# Patient Record
Sex: FEMALE | Race: WHITE | Employment: UNEMPLOYED
[De-identification: names, ages, dates, MRNs, and addresses within clinical notes are randomized per-mention and may not be internally consistent; named-entity substitution may affect disease eponyms.]

---

## 2019-07-23 ENCOUNTER — HOSPITAL ENCOUNTER (OUTPATIENT)
Dept: GENERAL RADIOLOGY | Facility: CLINIC | Age: 69
Discharge: HOME | End: 2019-07-25
Payer: MEDICARE

## 2019-07-23 ENCOUNTER — HOSPITAL ENCOUNTER (OUTPATIENT)
Dept: MAMMOGRAPHY | Facility: CLINIC | Age: 69
Discharge: HOME | End: 2019-07-25
Payer: MEDICARE

## 2019-07-23 DIAGNOSIS — Z78.0 POSTMENOPAUSAL: ICD-10-CM

## 2019-07-23 DIAGNOSIS — M53.3 SI (SACROILIAC) JOINT DYSFUNCTION: ICD-10-CM

## 2019-07-23 DIAGNOSIS — M54.40 ACUTE RIGHT-SIDED LOW BACK PAIN WITH SCIATICA, SCIATICA LATERALITY UNSPECIFIED: ICD-10-CM

## 2019-07-23 PROBLEM — M99.01 CERVICAL SOMATIC DYSFUNCTION: Status: ACTIVE | Noted: 2017-11-08

## 2019-07-23 PROBLEM — Z13.6 SCREENING FOR CARDIOVASCULAR CONDITION: Status: ACTIVE | Noted: 2017-11-08

## 2019-07-23 PROBLEM — N81.2 UTEROVAGINAL PROLAPSE, INCOMPLETE: Status: ACTIVE | Noted: 2017-06-08

## 2019-07-23 PROBLEM — M99.02 THORACIC REGION SOMATIC DYSFUNCTION: Status: ACTIVE | Noted: 2017-11-08

## 2019-07-23 PROCEDURE — 72100 X-RAY EXAM L-S SPINE 2/3 VWS: CPT

## 2019-07-23 PROCEDURE — 77080 DXA BONE DENSITY AXIAL: CPT

## 2019-08-22 PROBLEM — Z13.6 SCREENING FOR CARDIOVASCULAR CONDITION: Status: RESOLVED | Noted: 2017-11-08 | Resolved: 2019-08-22

## 2020-08-25 PROBLEM — M25.552 LEFT HIP PAIN: Status: ACTIVE | Noted: 2020-08-25

## 2021-10-20 ENCOUNTER — HOSPITAL ENCOUNTER (OUTPATIENT)
Dept: MAMMOGRAPHY | Facility: CLINIC | Age: 71
Discharge: HOME | End: 2021-10-22
Payer: MEDICARE

## 2021-10-20 DIAGNOSIS — Z12.31 VISIT FOR SCREENING MAMMOGRAM: ICD-10-CM

## 2021-10-20 PROCEDURE — 77067 SCR MAMMO BI INCL CAD: CPT

## 2023-01-03 RX ORDER — ZOLPIDEM TARTRATE 10 MG/1
0.5 TABLET ORAL NIGHTLY PRN
COMMUNITY

## 2023-01-04 ENCOUNTER — ANESTHESIA EVENT (OUTPATIENT)
Dept: OPERATING ROOM | Age: 73
End: 2023-01-04
Payer: MEDICARE

## 2023-01-04 ENCOUNTER — ANESTHESIA (OUTPATIENT)
Dept: OPERATING ROOM | Age: 73
End: 2023-01-04
Payer: MEDICARE

## 2023-01-04 ENCOUNTER — HOSPITAL ENCOUNTER (OUTPATIENT)
Age: 73
Setting detail: OUTPATIENT SURGERY
Discharge: HOME | End: 2023-01-04
Attending: OPHTHALMOLOGY | Admitting: OPHTHALMOLOGY
Payer: MEDICARE

## 2023-01-04 VITALS
BODY MASS INDEX: 29.09 KG/M2 | HEART RATE: 74 BPM | HEIGHT: 66 IN | TEMPERATURE: 97.7 F | OXYGEN SATURATION: 93 % | RESPIRATION RATE: 20 BRPM | DIASTOLIC BLOOD PRESSURE: 66 MMHG | WEIGHT: 181 LBS | SYSTOLIC BLOOD PRESSURE: 151 MMHG

## 2023-01-04 PROCEDURE — 3700000001 HC ADD 15 MINUTES (ANESTHESIA): Performed by: OPHTHALMOLOGY

## 2023-01-04 PROCEDURE — 2580000003 HC RX 258: Performed by: NURSE ANESTHETIST, CERTIFIED REGISTERED

## 2023-01-04 PROCEDURE — 6370000000 HC RX 637 (ALT 250 FOR IP): Performed by: OPHTHALMOLOGY

## 2023-01-04 PROCEDURE — 3600000004 HC SURGERY LEVEL 4 BASE: Performed by: OPHTHALMOLOGY

## 2023-01-04 PROCEDURE — 7100000010 HC PHASE II RECOVERY - FIRST 15 MIN: Performed by: OPHTHALMOLOGY

## 2023-01-04 PROCEDURE — 6360000002 HC RX W HCPCS: Performed by: OPHTHALMOLOGY

## 2023-01-04 PROCEDURE — 7100000000 HC PACU RECOVERY - FIRST 15 MIN: Performed by: OPHTHALMOLOGY

## 2023-01-04 PROCEDURE — 2709999900 HC NON-CHARGEABLE SUPPLY: Performed by: OPHTHALMOLOGY

## 2023-01-04 PROCEDURE — 2720000010 HC SURG SUPPLY STERILE: Performed by: OPHTHALMOLOGY

## 2023-01-04 PROCEDURE — 2580000003 HC RX 258: Performed by: OPHTHALMOLOGY

## 2023-01-04 PROCEDURE — 6360000002 HC RX W HCPCS: Performed by: NURSE ANESTHETIST, CERTIFIED REGISTERED

## 2023-01-04 PROCEDURE — 3700000000 HC ANESTHESIA ATTENDED CARE: Performed by: OPHTHALMOLOGY

## 2023-01-04 PROCEDURE — 3600000014 HC SURGERY LEVEL 4 ADDTL 15MIN: Performed by: OPHTHALMOLOGY

## 2023-01-04 PROCEDURE — 2500000003 HC RX 250 WO HCPCS: Performed by: NURSE ANESTHETIST, CERTIFIED REGISTERED

## 2023-01-04 PROCEDURE — 6360000002 HC RX W HCPCS: Performed by: STUDENT IN AN ORGANIZED HEALTH CARE EDUCATION/TRAINING PROGRAM

## 2023-01-04 PROCEDURE — 7100000001 HC PACU RECOVERY - ADDTL 15 MIN: Performed by: OPHTHALMOLOGY

## 2023-01-04 PROCEDURE — 7100000011 HC PHASE II RECOVERY - ADDTL 15 MIN: Performed by: OPHTHALMOLOGY

## 2023-01-04 PROCEDURE — 2500000003 HC RX 250 WO HCPCS: Performed by: OPHTHALMOLOGY

## 2023-01-04 RX ORDER — ONDANSETRON 2 MG/ML
4 INJECTION INTRAMUSCULAR; INTRAVENOUS
Status: COMPLETED | OUTPATIENT
Start: 2023-01-04 | End: 2023-01-04

## 2023-01-04 RX ORDER — HYDRALAZINE HYDROCHLORIDE 20 MG/ML
10 INJECTION INTRAMUSCULAR; INTRAVENOUS
Status: DISCONTINUED | OUTPATIENT
Start: 2023-01-04 | End: 2023-01-04 | Stop reason: HOSPADM

## 2023-01-04 RX ORDER — SODIUM CHLORIDE, SODIUM LACTATE, POTASSIUM CHLORIDE, CALCIUM CHLORIDE 600; 310; 30; 20 MG/100ML; MG/100ML; MG/100ML; MG/100ML
INJECTION, SOLUTION INTRAVENOUS CONTINUOUS PRN
Status: DISCONTINUED | OUTPATIENT
Start: 2023-01-04 | End: 2023-01-04 | Stop reason: SDUPTHER

## 2023-01-04 RX ORDER — PROPOFOL 10 MG/ML
INJECTION, EMULSION INTRAVENOUS PRN
Status: DISCONTINUED | OUTPATIENT
Start: 2023-01-04 | End: 2023-01-04 | Stop reason: SDUPTHER

## 2023-01-04 RX ORDER — SODIUM CHLORIDE 0.9 % (FLUSH) 0.9 %
5-40 SYRINGE (ML) INJECTION PRN
Status: DISCONTINUED | OUTPATIENT
Start: 2023-01-04 | End: 2023-01-04 | Stop reason: HOSPADM

## 2023-01-04 RX ORDER — CYCLOPENTOLATE HYDROCHLORIDE 10 MG/ML
SOLUTION/ DROPS OPHTHALMIC PRN
Status: DISCONTINUED | OUTPATIENT
Start: 2023-01-04 | End: 2023-01-04 | Stop reason: HOSPADM

## 2023-01-04 RX ORDER — ONDANSETRON 2 MG/ML
INJECTION INTRAMUSCULAR; INTRAVENOUS PRN
Status: DISCONTINUED | OUTPATIENT
Start: 2023-01-04 | End: 2023-01-04 | Stop reason: SDUPTHER

## 2023-01-04 RX ORDER — SODIUM CHLORIDE 9 MG/ML
INJECTION, SOLUTION INTRAVENOUS PRN
Status: DISCONTINUED | OUTPATIENT
Start: 2023-01-04 | End: 2023-01-04 | Stop reason: HOSPADM

## 2023-01-04 RX ORDER — DEXAMETHASONE SODIUM PHOSPHATE 10 MG/ML
INJECTION, SOLUTION INTRAMUSCULAR; INTRAVENOUS PRN
Status: DISCONTINUED | OUTPATIENT
Start: 2023-01-04 | End: 2023-01-04 | Stop reason: SDUPTHER

## 2023-01-04 RX ORDER — OFLOXACIN 3 MG/ML
1 SOLUTION/ DROPS OPHTHALMIC
Status: COMPLETED | OUTPATIENT
Start: 2023-01-04 | End: 2023-01-04

## 2023-01-04 RX ORDER — CYCLOPENTOLATE HYDROCHLORIDE 10 MG/ML
1 SOLUTION/ DROPS OPHTHALMIC
Status: COMPLETED | OUTPATIENT
Start: 2023-01-04 | End: 2023-01-04

## 2023-01-04 RX ORDER — NEOMYCIN SULFATE, POLYMYXIN B SULFATE, AND DEXAMETHASONE 3.5; 10000; 1 MG/G; [USP'U]/G; MG/G
OINTMENT OPHTHALMIC PRN
Status: DISCONTINUED | OUTPATIENT
Start: 2023-01-04 | End: 2023-01-04 | Stop reason: HOSPADM

## 2023-01-04 RX ORDER — MIDAZOLAM HYDROCHLORIDE 2 MG/2ML
2 INJECTION, SOLUTION INTRAMUSCULAR; INTRAVENOUS ONCE
Status: COMPLETED | OUTPATIENT
Start: 2023-01-04 | End: 2023-01-04

## 2023-01-04 RX ORDER — SODIUM CHLORIDE 0.9 % (FLUSH) 0.9 %
SYRINGE (ML) INJECTION PRN
Status: DISCONTINUED | OUTPATIENT
Start: 2023-01-04 | End: 2023-01-04 | Stop reason: HOSPADM

## 2023-01-04 RX ORDER — PHENYLEPHRINE HYDROCHLORIDE 100 MG/ML
1 SOLUTION/ DROPS OPHTHALMIC
Status: COMPLETED | OUTPATIENT
Start: 2023-01-04 | End: 2023-01-04

## 2023-01-04 RX ORDER — SODIUM CHLORIDE 0.9 % (FLUSH) 0.9 %
5-40 SYRINGE (ML) INJECTION EVERY 12 HOURS SCHEDULED
Status: DISCONTINUED | OUTPATIENT
Start: 2023-01-04 | End: 2023-01-04 | Stop reason: HOSPADM

## 2023-01-04 RX ORDER — LIDOCAINE HYDROCHLORIDE 10 MG/ML
INJECTION, SOLUTION EPIDURAL; INFILTRATION; INTRACAUDAL; PERINEURAL PRN
Status: DISCONTINUED | OUTPATIENT
Start: 2023-01-04 | End: 2023-01-04 | Stop reason: SDUPTHER

## 2023-01-04 RX ORDER — BALANCED SALT SOLUTION ENRICHED WITH BICARBONATE, DEXTROSE, AND GLUTATHIONE
KIT INTRAOCULAR PRN
Status: DISCONTINUED | OUTPATIENT
Start: 2023-01-04 | End: 2023-01-04 | Stop reason: HOSPADM

## 2023-01-04 RX ORDER — TROPICAMIDE 10 MG/ML
1 SOLUTION/ DROPS OPHTHALMIC
Status: COMPLETED | OUTPATIENT
Start: 2023-01-04 | End: 2023-01-04

## 2023-01-04 RX ORDER — VANCOMYCIN HYDROCHLORIDE 500 MG/10ML
INJECTION, POWDER, LYOPHILIZED, FOR SOLUTION INTRAVENOUS PRN
Status: DISCONTINUED | OUTPATIENT
Start: 2023-01-04 | End: 2023-01-04 | Stop reason: HOSPADM

## 2023-01-04 RX ORDER — ROCURONIUM BROMIDE 10 MG/ML
INJECTION, SOLUTION INTRAVENOUS PRN
Status: DISCONTINUED | OUTPATIENT
Start: 2023-01-04 | End: 2023-01-04 | Stop reason: SDUPTHER

## 2023-01-04 RX ORDER — VANCOMYCIN HYDROCHLORIDE 1 G/20ML
INJECTION, POWDER, LYOPHILIZED, FOR SOLUTION INTRAVENOUS PRN
Status: DISCONTINUED | OUTPATIENT
Start: 2023-01-04 | End: 2023-01-04 | Stop reason: HOSPADM

## 2023-01-04 RX ADMIN — SODIUM CHLORIDE, POTASSIUM CHLORIDE, SODIUM LACTATE AND CALCIUM CHLORIDE: 600; 310; 30; 20 INJECTION, SOLUTION INTRAVENOUS at 10:16

## 2023-01-04 RX ADMIN — LIDOCAINE HYDROCHLORIDE 50 MG: 10 INJECTION, SOLUTION EPIDURAL; INFILTRATION; INTRACAUDAL; PERINEURAL at 10:20

## 2023-01-04 RX ADMIN — ROCURONIUM BROMIDE 10 MG: 10 INJECTION, SOLUTION INTRAVENOUS at 11:01

## 2023-01-04 RX ADMIN — PHENYLEPHRINE HYDROCHLORIDE 100 MCG: 10 INJECTION INTRAVENOUS at 11:01

## 2023-01-04 RX ADMIN — ROCURONIUM BROMIDE 40 MG: 10 INJECTION, SOLUTION INTRAVENOUS at 10:20

## 2023-01-04 RX ADMIN — CYCLOPENTOLATE HYDROCHLORIDE 1 DROP: 10 SOLUTION/ DROPS OPHTHALMIC at 09:59

## 2023-01-04 RX ADMIN — HYDROMORPHONE HYDROCHLORIDE 0.25 MG: 1 INJECTION, SOLUTION INTRAMUSCULAR; INTRAVENOUS; SUBCUTANEOUS at 12:38

## 2023-01-04 RX ADMIN — PROPOFOL 150 MG: 10 INJECTION, EMULSION INTRAVENOUS at 10:20

## 2023-01-04 RX ADMIN — DEXAMETHASONE SODIUM PHOSPHATE 4 MG: 10 INJECTION, SOLUTION INTRAMUSCULAR; INTRAVENOUS at 10:30

## 2023-01-04 RX ADMIN — OFLOXACIN 1 DROP: 3 SOLUTION OPHTHALMIC at 09:34

## 2023-01-04 RX ADMIN — TROPICAMIDE 1 DROP: 10 SOLUTION/ DROPS OPHTHALMIC at 09:34

## 2023-01-04 RX ADMIN — CYCLOPENTOLATE HYDROCHLORIDE 1 DROP: 10 SOLUTION/ DROPS OPHTHALMIC at 10:09

## 2023-01-04 RX ADMIN — ONDANSETRON 4 MG: 2 INJECTION INTRAMUSCULAR; INTRAVENOUS at 11:11

## 2023-01-04 RX ADMIN — OFLOXACIN 1 DROP: 3 SOLUTION OPHTHALMIC at 09:44

## 2023-01-04 RX ADMIN — SUGAMMADEX 200 MG: 100 INJECTION, SOLUTION INTRAVENOUS at 11:20

## 2023-01-04 RX ADMIN — MIDAZOLAM 2 MG: 1 INJECTION INTRAMUSCULAR; INTRAVENOUS at 10:03

## 2023-01-04 RX ADMIN — OFLOXACIN 1 DROP: 3 SOLUTION OPHTHALMIC at 10:00

## 2023-01-04 RX ADMIN — PHENYLEPHRINE HYDROCHLORIDE 1 DROP: 100 SOLUTION/ DROPS OPHTHALMIC at 10:11

## 2023-01-04 RX ADMIN — TROPICAMIDE 1 DROP: 10 SOLUTION/ DROPS OPHTHALMIC at 10:10

## 2023-01-04 RX ADMIN — PHENYLEPHRINE HYDROCHLORIDE 1 DROP: 100 SOLUTION/ DROPS OPHTHALMIC at 09:58

## 2023-01-04 RX ADMIN — CYCLOPENTOLATE HYDROCHLORIDE 1 DROP: 10 SOLUTION/ DROPS OPHTHALMIC at 09:34

## 2023-01-04 RX ADMIN — OFLOXACIN 1 DROP: 3 SOLUTION OPHTHALMIC at 10:11

## 2023-01-04 RX ADMIN — HYDROMORPHONE HYDROCHLORIDE 0.25 MG: 1 INJECTION, SOLUTION INTRAMUSCULAR; INTRAVENOUS; SUBCUTANEOUS at 12:54

## 2023-01-04 RX ADMIN — CYCLOPENTOLATE HYDROCHLORIDE 1 DROP: 10 SOLUTION/ DROPS OPHTHALMIC at 09:44

## 2023-01-04 RX ADMIN — ONDANSETRON 4 MG: 2 INJECTION INTRAMUSCULAR; INTRAVENOUS at 12:02

## 2023-01-04 RX ADMIN — TROPICAMIDE 1 DROP: 10 SOLUTION/ DROPS OPHTHALMIC at 09:44

## 2023-01-04 RX ADMIN — TROPICAMIDE 1 DROP: 10 SOLUTION/ DROPS OPHTHALMIC at 10:00

## 2023-01-04 RX ADMIN — PHENYLEPHRINE HYDROCHLORIDE 1 DROP: 100 SOLUTION/ DROPS OPHTHALMIC at 10:13

## 2023-01-04 ASSESSMENT — PAIN DESCRIPTION - LOCATION: LOCATION: EYE

## 2023-01-04 ASSESSMENT — PAIN SCALES - WONG BAKER
WONGBAKER_NUMERICALRESPONSE: 0
WONGBAKER_NUMERICALRESPONSE: 0

## 2023-01-04 ASSESSMENT — PAIN SCALES - GENERAL
PAINLEVEL_OUTOF10: 0
PAINLEVEL_OUTOF10: 5
PAINLEVEL_OUTOF10: 5

## 2023-01-04 ASSESSMENT — PAIN DESCRIPTION - DESCRIPTORS: DESCRIPTORS: BURNING

## 2023-01-04 ASSESSMENT — PAIN DESCRIPTION - ORIENTATION: ORIENTATION: LEFT

## 2023-01-04 NOTE — PROGRESS NOTES
09:40- Admission database completed and \"sudafed\" added as an allergy as pt states she is allergic and breaks out in a \"severe rash\" when she takes it. I contacted the pharmacist as pt has phenylephrine eye drops ordered pre- procedure. Pharmacist stated she needs to look into and call me back. 09:45- Pharmacist called back and stated I needed to check with Dr. Kristofer Mckay to see if there is an alternate eye drop he would like to use. 09:55- Dr. Lester Cirilo in to see pt and stated she has the eye drop in his office. Pt denies having a reaction after therefore Dr. Kristofer Mckay ok'd the use of the phenylephrine eye drop. Drops administered as ordered.

## 2023-01-04 NOTE — BRIEF OP NOTE
Brief Postoperative Note      Patient: Tia Reasons  YOB: 1950  MRN: [id number]    Date of Procedure: 1/4/2023    Pre-Op Diagnosis: LEFT MACULAR HOLE    Post-Op Diagnosis: Same       Procedure(s):  PARS PLANA VITRECTOMY 25 GAUGE, MEMBRANE PEEL, AIR FLUID EXCHANGE, AIR GAS EXCHANGE WITH 12% C3F8,  ICG    Surgeon(s):  Tirso Hair MD    Assistant:  * No surgical staff found *    Anesthesia: General    Estimated Blood Loss (mL): Minimal    Complications: None    Specimens:   * No specimens in log *    Implants:  * No implants in log *      Drains: * No LDAs found *    Findings:     Electronically signed by Tirso Hair MD on 1/4/2023 at 11:34 AM

## 2023-01-04 NOTE — ANESTHESIA PRE PROCEDURE
Department of Anesthesiology  Preprocedure Note       Name:  Silvana Zhang   Age:  67 y.o.  :  1950                                          MRN:  [id number]         Date:  2023      Surgeon: Mana Gerber):  Ousmane Lai MD    Procedure: Procedure(s):  PARS PLANA VITRECTOMY 25 GAUGE, MEMBRANE PEEL, FLUID GAS EXCHANGE,  ICG    Medications prior to admission:   Prior to Admission medications    Medication Sig Start Date End Date Taking? Authorizing Provider   zolpidem (AMBIEN) 10 MG tablet Take 0.5 tablets by mouth nightly as needed. Historical Provider, MD   levothyroxine (SYNTHROID) 75 MCG tablet TAKE 1 TABLET DAILY 22   Yamil Jones MD   simvastatin (ZOCOR) 20 MG tablet TAKE 1 TABLET NIGHTLY 22   Yamil Jones MD   citalopram (CELEXA) 10 MG tablet TAKE 1 TABLET DAILY 22   MD Patricio Meza MISC by Does not apply route 5 years 22   Yamil Jones MD   albuterol sulfate HFA (PROVENTIL;VENTOLIN;PROAIR) 108 (90 Base) MCG/ACT inhaler Inhale 2 puffs into the lungs every 4-6 hours as needed Hasn't needed to use    Historical Provider, MD   budesonide-formoterol (SYMBICORT) 80-4.5 MCG/ACT AERO Inhale 2 puffs into the lungs 2 times daily Pt states she hasn't needed to use it    Historical Provider, MD       Current medications:    No current facility-administered medications for this encounter. Current Outpatient Medications   Medication Sig Dispense Refill    zolpidem (AMBIEN) 10 MG tablet Take 0.5 tablets by mouth nightly as needed.       levothyroxine (SYNTHROID) 75 MCG tablet TAKE 1 TABLET DAILY 90 tablet 3    simvastatin (ZOCOR) 20 MG tablet TAKE 1 TABLET NIGHTLY 90 tablet 3    citalopram (CELEXA) 10 MG tablet TAKE 1 TABLET DAILY 90 tablet 3    Handicap Placard MISC by Does not apply route 5 years 1 each 0    albuterol sulfate HFA (PROVENTIL;VENTOLIN;PROAIR) 108 (90 Base) MCG/ACT inhaler Inhale 2 puffs into the lungs every 4-6 hours as needed Hasn't needed to use      budesonide-formoterol (SYMBICORT) 80-4.5 MCG/ACT AERO Inhale 2 puffs into the lungs 2 times daily Pt states she hasn't needed to use it         Allergies:     Allergies   Allergen Reactions    Penicillins Hives and Other (See Comments)    Atorvastatin      Other reaction(s): Muscle Pain       Problem List:    Patient Active Problem List   Diagnosis Code    Nephrolithiasis N20.0    Hypercholesterolemia E78.00    Hypothyroidism E03.9    Kidney stone on right side N20.0    Right flank pain R10.9    Thyroid disease E07.9    Arthritis M19.90    Asthma J45.909    Ureterolithiasis N20.1    Anemia D64.9    Hyperglycemia R73.9    Elevated lipase R74.8    Abdominal pain R10.9    Gallstones K80.20    Renal calculus, right N20.0    Atopic rhinitis J30.9    Blood glucose abnormal R73.09    Cervical somatic dysfunction M99.01    Insomnia G47.00    Obstructive sleep apnea syndrome G47.33    On statin therapy Z79.899    SI (sacroiliac) joint dysfunction M53.3    Snoring R06.83    Thoracic region somatic dysfunction M99.02    Uterine leiomyoma D25.9    Uterovaginal prolapse, incomplete N81.2    Left hip pain M25.552       Past Medical History:        Diagnosis Date    Arthritis     Asthma     History of foot fracture     Hyperlipidemia     Hypothyroidism     Kidney stone     rt ureter stent placed and removed    Sleep apnea     no cpap    Thyroid disease     Under care of team     [facility] Dr. Greta Grier sees q month    Wears dentures     Wears reading eyeglasses     Wellness examination     Dr. Harley Ortiz last visit mid 2022       Past Surgical History:        Procedure Laterality Date    CHOLECYSTECTOMY      CYSTOSCOPY  10/28/2014    EYE SURGERY Left 2019    laser lt eye 2019   bilat cataracts    FOOT SURGERY Left 2014    HYSTERECTOMY, TOTAL ABDOMINAL (CERVIX REMOVED)      LITHOTRIPSY Right 11/03/2014    LITHOTRIPSY Right 03/31/2015    cysto,stent    URETER STENT PLACEMENT Right 10/2014       Social History:    Social History     Tobacco Use    Smoking status: Never    Smokeless tobacco: Never   Substance Use Topics    Alcohol use: No     Comment: very seldom per pt                                Counseling given: Not Answered      Vital Signs (Current):   Vitals:    01/03/23 1443   Weight: 181 lb (82.1 kg)   Height: 5' 6\" (1.676 m)                                              BP Readings from Last 3 Encounters:   09/13/22 100/62   08/30/22 122/78   03/18/22 112/64       NPO Status:                                                                                 BMI:   Wt Readings from Last 3 Encounters:   01/03/23 181 lb (82.1 kg)   09/13/22 189 lb (85.7 kg)   08/30/22 188 lb (85.3 kg)     Body mass index is 29.21 kg/m². CBC:   Lab Results   Component Value Date/Time    WBC 5.6 01/18/2022 12:00 AM    RBC 4.68 01/18/2022 12:00 AM    HGB 14.1 01/18/2022 12:00 AM    HCT 42.8 01/18/2022 12:00 AM    MCV 91 01/18/2022 12:00 AM    RDW 14.0 01/18/2022 12:00 AM     01/18/2022 12:00 AM       CMP:   Lab Results   Component Value Date/Time     01/18/2022 12:00 AM    K 4.4 01/18/2022 12:00 AM     01/18/2022 12:00 AM    CO2 25 01/18/2022 12:00 AM    BUN 21 01/18/2022 12:00 AM    CREATININE 1.19 01/18/2022 12:00 AM    GFRAA >60 04/03/2015 06:17 AM    LABGLOM >60 04/03/2015 06:17 AM    GLUCOSE 135 04/03/2015 06:17 AM    PROT 7.6 01/18/2022 12:00 AM    CALCIUM 9.7 01/18/2022 12:00 AM    BILITOT 0.7 01/18/2022 12:00 AM    ALKPHOS 104 01/18/2022 12:00 AM    AST 15 01/18/2022 12:00 AM    ALT 12 01/18/2022 12:00 AM       POC Tests: No results for input(s): POCGLU, POCNA, POCK, POCCL, POCBUN, POCHEMO, POCHCT in the last 72 hours.     Coags:   Lab Results   Component Value Date/Time    PROTIME 10.4 04/18/2014 01:53 AM    INR 1.0 04/18/2014 01:53 AM    APTT 26.1 04/18/2014 01:53 AM       HCG (If Applicable): No results found for: PREGTESTUR, PREGSERUM, HCG, HCGQUANT     ABGs: No results found for: PHART, PO2ART, YPK8JEN, IAK0STD, BEART, Y8BTESBE     Type & Screen (If Applicable):  No results found for: LABABO, LABRH    Drug/Infectious Status (If Applicable):  No results found for: HIV, HEPCAB    COVID-19 Screening (If Applicable): No results found for: COVID19        Anesthesia Evaluation  Patient summary reviewed and Nursing notes reviewed no history of anesthetic complications:   Airway: Mallampati: II  TM distance: >3 FB   Neck ROM: full  Mouth opening: > = 3 FB   Dental:          Pulmonary: breath sounds clear to auscultation  (+) sleep apnea:  asthma:                            Cardiovascular:    (+) hyperlipidemia        Rhythm: regular  Rate: normal                    Neuro/Psych:   Negative Neuro/Psych ROS              GI/Hepatic/Renal: Neg GI/Hepatic/Renal ROS            Endo/Other:    (+) hypothyroidism, blood dyscrasia: anemia, arthritis:., .                 Abdominal:             Vascular: negative vascular ROS. Other Findings:           Anesthesia Plan      MAC and general     ASA 2       Induction: intravenous. Anesthetic plan and risks discussed with patient. Plan discussed with CRNA.                     Shahrzad Greco MD   1/4/2023

## 2023-01-04 NOTE — ANESTHESIA POSTPROCEDURE EVALUATION
Department of Anesthesiology  Postprocedure Note    Patient: Devonte Presley  MRN: [id number]  YOB: 1950  Date of evaluation: 1/4/2023      Procedure Summary     Date: 01/04/23 Room / Location: [address]    Anesthesia Start: 7362 Anesthesia Stop: 9476    Procedure: PARS PLANA VITRECTOMY 25 GAUGE, MEMBRANE PEEL, AIR FLUID EXCHANGE, AIR GAS EXCHANGE WITH 12% C3F8,  ICG (Left: Eye) Diagnosis:       Lamellar macular hole of left eye      (LEFT MACULAR HOLE)    Surgeons: Perez Alvarez MD Responsible Provider: Mir Schmitz MD    Anesthesia Type: MAC, general ASA Status: 2          Anesthesia Type: No value filed.     Jeanne Phase I: Jeanne Score: 9    Jeanne Phase II:        Anesthesia Post Evaluation    Patient location during evaluation: bedside  Patient participation: complete - patient participated  Level of consciousness: awake  Airway patency: patent  Nausea & Vomiting: no nausea and no vomiting  Complications: no  Cardiovascular status: blood pressure returned to baseline  Respiratory status: acceptable  Hydration status: euvolemic  Comments: /88   Pulse 71   Temp 97.1 °F (36.2 °C) (Temporal)   Resp 14   Ht 5' 6\" (1.676 m)   Wt 181 lb (82.1 kg)   SpO2 100%   BMI 29.21 kg/m²

## 2023-01-04 NOTE — H&P
History and Physical    Pt Name: Jocelyn Bellamy  MRN: [id number]  YOB: 1950  Date of evaluation: 1/4/2023  Primary Care Physician: Yared Pryor MD    SUBJECTIVE:   History of Chief Complaint:    Jocelyn Bellamy is a 67 y.o. female who is scheduled today for PARS PLANA VITRECTOMY 25 GAUGE, MEMBRANE PEEL, FLUID GAS EXCHANGE,  ICG - Left. Patient reports history of wavy, distorted, blurred vision to her left eye for the last several months. Patient states her right eye is asymptomatic. She has a history of bilateral cataract extraction in 2021. Allergies  is allergic to penicillins, atorvastatin, and sudafed [pseudoephedrine]. Medications  Prior to Admission medications    Medication Sig Start Date End Date Taking? Authorizing Provider   zolpidem (AMBIEN) 10 MG tablet Take 0.5 tablets by mouth nightly as needed. Historical Provider, MD   levothyroxine (SYNTHROID) 75 MCG tablet TAKE 1 TABLET DAILY 12/28/22   Yared Pryor MD   simvastatin (ZOCOR) 20 MG tablet TAKE 1 TABLET NIGHTLY 12/5/22   Yared Pryor MD   citalopram (CELEXA) 10 MG tablet TAKE 1 TABLET DAILY 9/2/22   Yared Pryor MD   Handicap Ashley MISC by Does not apply route 5 years 8/30/22   Yared Pryor MD   albuterol sulfate HFA (PROVENTIL;VENTOLIN;PROAIR) 108 (90 Base) MCG/ACT inhaler Inhale 2 puffs into the lungs every 4-6 hours as needed Hasn't needed to use    Historical MD Christiana   budesonide-formoterol (SYMBICORT) 80-4.5 MCG/ACT AERO Inhale 2 puffs into the lungs 2 times daily Pt states she hasn't needed to use it    Historical Provider, MD     Past Medical History    has a past medical history of Arthritis, Asthma, History of foot fracture, Hyperlipidemia, Hypothyroidism, Kidney stone, Sleep apnea, Thyroid disease, Under care of team, Wears dentures, Wears reading eyeglasses, and Wellness examination. Past Surgical History   has a past surgical history that includes Cystocopy (10/28/2014);  Lithotripsy (Right, 2014); Ureter stent placement (Right, 10/2014); Lithotripsy (Right, 2015); Cholecystectomy; Hysterectomy, total abdominal; Foot surgery (Left, ); and eye surgery (Left, ). Social History   reports that she has never smoked. She has never used smokeless tobacco.   reports no history of alcohol use. reports no history of drug use. Marital Status   Children 2  Occupation retired  Family History  Family Status   Relation Name Status    Mother      Father      Brother  (Not Specified)     family history includes Cancer in her brother and father; High Cholesterol in her father. Review of Systems:  CONSTITUTIONAL:   negative for fevers, chills, fatigue and malaise    EYES:   negative for double vision, blurred, distorted, wavy vision to left eye   HEENT:   negative for tinnitus, epistaxis and sore throat     RESPIRATORY:   negative for cough, shortness of breath, wheezing     CARDIOVASCULAR:   negative for chest pain, palpitations, syncope, edema     GASTROINTESTINAL:   negative for nausea, vomiting     GENITOURINARY:   negative for incontinence     MUSCULOSKELETAL:   negative for neck or back pain     NEUROLOGICAL:   Negative for weakness and tingling  negative for headaches and dizziness     PSYCHIATRIC:   negative for anxiety       OBJECTIVE:   VITALS:  height is 5' 6\" (1.676 m) and weight is 181 lb (82.1 kg). See nursing flowsheet. CONSTITUTIONAL:alert & oriented x 3, no acute distress. Calm and pleasant. SKIN:  Warm and dry, no rashes to exposed areas of skin. HEAD:  Normocephalic, atraumatic. EYES: PERRL. EOMs intact. EARS:  Intact and equal bilaterally. No edema or thickening, without lumps, lesions, or discharge. Hearing grossly WNL. NOSE:  Nares patent. No rhinorrhea. MOUTH/THROAT:  Mucous membranes pink and moist, uvula midline, wearing full upper and lower dentures. NECK: Supple, no lymphadenopathy.   LUNGS: Respirations even and non-labored. Clear to auscultation bilaterally, no wheezes, rales, or rhonchi. CARDIOVASCULAR: Regular rate and rhythm, no murmurs/rubs/gallops. ABDOMEN: soft, non-tender and non-distended, bowel sounds active x 4. EXTREMITIES: No edema to bilateral lower extremities. No varicosities to bilateral lower extremities. NEUROLOGIC: CN II-XII are grossly intact. Gait not assessed. IMPRESSIONS:   Lamellar macular hole of left eye. PLANS:   PARS PLANA VITRECTOMY 25 GAUGE, MEMBRANE PEEL, FLUID GAS EXCHANGE,  ICG - Left.     LEW Jones - CNP   Electronically signed 1/4/2023 at 9:16 AM

## 2023-01-04 NOTE — DISCHARGE INSTRUCTIONS
Going Home Instructions and Medication Schedule  Use one drop in the operated eye(s) at each time marked with a CHECK  If you are using more than one kind of drop, allow three minutes between drops. Use Pain Medication Prescribed by your Surgeon    Positioning    [x]  Face Down     []  Left Side     []  Right Side     []  Either Side       []  Head Elevated on back at 30 deg     [x]  Right Side Down( while in hospital )          Going Home Instructions and Medication Schedule  Use one drop in the operated eye(s) at each time marked with a CHECK  If you are using more than one kind of drop, allow three minutes between drops. Use Pain Medication Prescribed by your Surgeon    Positioning    []  Face Down     []  Left Side     []  Right Side     []  Either Side       []  Head Elevated on back at 30 deg     []  Right Side Down     []  Left Side Down     LOOK DOWN  FOR 24 HRS       When Cleansing the Eye:  1. Wash you hands with soap and water. 2.  Open 2 X 2 dressing and eye patch, tear 6 inches along tape. 3.  Moisten 2 X 2 dressing with sterile irrigating solution. 4.  Gently cleanse eye with one 2 X 2 from inside corner of eye to outside edge. Then        repeat with second 2 X 2. Remember to only use 2 X 2 once. When Giving Eye Drops or Ointments:  1. Cleanse eye as above before giving drops or ointments. 2.  Have the patient look toward the ceiling with both eyes open. 3.  Pull the lower lid down - steady your hand on the patient's forehead. 4.  Put a drop of medicine or a small strip of ointment in the sac behind the       lashes of the lower lid,  5. The tip of the dropper or ointment tube should not touch the eye itself. 6.  Don't give more than one eye medicine at a time. Wait about 3 minutes between           each. 7.  When using both ointment and drops, use the ointment after the drops. 8. If you put in your own drops, it may be easier to lie down.   Ask a friend or relative to make sure you actually get the medication into the eye. General Instruction:  1. Resume all medications taken before hospitalization. 2.  You may have occasional discomfort after returning home. Take 1 - 2 regular or               extra strength acetaminophen (Tylenol) or other non-aspirin pain relievers every 4 - 6       hours if needed for pain. 3.  If you experience severe pain which is not relieved by the above measures, or if you         develop excessive discharge, please call our office. 4.  For any other problems or questions that arise, please call our office. 5. Bring all post op drops given to you after surgery to your post -op appointment tomorrow . Do not use any drops until you are given instructions  by Dr Kenna Dominique 's staff tomorrow. Please bring this instruction sheet and your medication with you to your office  Visit.     Name of Drop Cap Color 8:00 AM  Breakfast 12:00 PM  Lunch 6:00 PM  Supper 10:00 PM  Bedtime   Acular/[place] Shaw   []     []         []         []         Alphagan Purple    []        []         []         []         Cosopt White/Blue   []         []         []         []         Econopred Plus [place]   []         []         []         []         Lotemax Pink   []         []         []         []         Lumigan Teal   []         []         []         []         Ocuflox/Ciloxan Knott   []         []         []         []         Pred G White   []         []         []         []         Timolol/Betimol Yellow   []         []         []         []         Travatan/Xalatan Teal   []         []         []         []         Trusopt Orange   []         []         []         []         Vigamox Knott   []         []         []         []         ATROPINE Red   []         []         []         []         Ointment    []         []         []         [x]           Additional Instructions and Information:    1.  fI gas was put in your eye during you operation, airplane travel is not permitted until this is approved by your doctor. If you expect to undergo any procedures requiring anesthesia, including dental procedures requiring nitrous gas, notify your doctor                immediately. 2.  If the operated eye is comfortable, you may go without an eye patch. You should wear your regular glasses or sunglasses during the day, which will provide protection for the eye. If you were given an eye shield protector, wear this at night over the operate eye. 3.  Light flashes or other visula symptoms are commin during the post-operative period. Both eyes can be light sensitive and neither condition is cause for alarm. Dark glasses may be helpful in decreasing light sensitivity. Activities May Be Resumed According to the Following Schedule: Activity Time After [facility] or shower with care to keep soap away from eyes Immediately   Shaving Immediately   Careful walking outdoors with a  (weather permitting) Immediately   Reading or watching television Immediately   Riding in a car Immediately   Shampooing hair (head held back _____  Immediately         _____ 2 days   Routine household chores (no scrubbing floors or lifting heavy object)  One Week   Haircut or appointment at beauty shop _____  Immediately         _____One Week   Return to employment Ask Physician   Driving Ask Physician     Any Activity That Makes Denyce Showers to the Head May Cause Eye Discomfort: The following activities should be avoided:  A. Lifting over 20 pounds (about a case of soda)  B. Sever physical exertion  C. Bending forward (keep the head upright and bend at the knees  D. Straining with bowel movement (use mild laxative or tiffany softener for constipation)     No alcoholic beverages, no driving or operating machinery, no making important decisions for 24 hours. You may have a normal diet but should eat lightly day of surgery. Drink plenty of fluids.   Urinate within 8 hours after surgery, if unable to urinate call your doctor    []  Left Side Down     LOOK DOWN  FOR 24 HRS

## 2023-01-05 NOTE — OP NOTE
[address] [facility]                                OPERATIVE REPORT    PATIENT NAME: Selivn Alston                       :        1950  MED REC NO:   [id number]                             ROOM:  ACCOUNT NO:   [de-identified]                           ADMIT DATE: 2023  PROVIDER:     Shun Gallego    DATE OF PROCEDURE:  2023    PREOPERATIVE DIAGNOSIS:  Left macular hole. POSTOPERATIVE DIAGNOSIS:  Left macular hole. OPERATION PERFORMED:  Left pars plana vitrectomy, membrane stripping,  air-fluid gas exchange. SURGEON:  Shun Obrien. Rosemary Ross MD    COMPLICATIONS:  None. ANESTHETIC:  General.    BLOOD LOSS:  0.    INDICATIONS FOR SURGERY:  The patient presented with a full-thickness  macular hole in the left eye. It was quite large. There was also  significant underlying dry AMD.  I explained to the patient the  prognosis is guarded mainly because the hole is very large, chronic, and  also because she has underlying AMD which will affect the visual  prognosis. I discussed risks and benefits of the surgery. I explained  that the hole may not close. Discussed the possibility the vision may  not improve because of the AMD, possibility of retinal detachment,  infection, glaucoma, optic neuropathy, loss of vision, blindness, loss  of eye. The patient understood these risks and was willing to proceed  with surgery. Also, she requested general anesthetic because of severe  anxiety and also because she cannot hold still. I discussed the risks  and benefits of general anesthesia along with the anesthesiologist.    OPERATIVE PROCEDURE:  The patient was taken to the operating room. General anesthetic was administered. The patient's left eye was draped  and prepped in usual sterile fashion.   In anticipation for a 25-gauge  surgery, Betadine was placed in the conjunctiva as prophylaxis against  infection. Measuring 3.5 mm posterior to the limbus in the  inferotemporal, supratemporal, and superonasal, trocars were placed  followed by the infusion cannula which was hooked in place  inferotemporally. Care was taken to ensure the infusion cannula was  seen in the vitreous cavity prior to turning the infusion cannula on. It was then turned on to 35. Ocutome with light pipe was placed in the  vitreous cavity. Core vitrectomy was carried out. PVD was induced with  the ocutome, and the posterior hyaloid was removed in a  posterior-anterior fashion for 360 degrees. At this time, several drops  of ICG in D5W only solution with no BSS were placed over the posterior  pole and allowed to stain for approximately 5 seconds and vacuumed off  with a silicone-tipped extrusion needle. All ICG was removed from the  macular hole in the optic nerve. Then, using a pancake contact lens, a  scratch down the internal membrane was made and the edge was lifted with  a Finesse laci-dusted loop. The edge of the ILM was grasped with the  ILM forceps and maculorrhexis performed leaving all traction off the  optic nerve. The entire maneuver took approximately 3 minutes. Light  was far away from the macula. Light was at 30 at all times. Peripheral  examination and scleral depression did not reveal any hole, detachment,  or retinal dialysis. At this time, air-fluid exchange was carried out  with silicone-tipped extrusion needle followed by 45 mL injection of 12%  C3F8 gas and vented out through _____ needle. The superior trocars were  removed. One did leak and I closed that with single 6-0 gut suture. The infusion cannula was removed. This was airtight as well. Subconjunctival vancomycin was given superiorly and inferiorly. Antibiotic ointment was placed and the eye patched and shielded.   The  patient returned to recovery room in satisfactory condition with  instructions for strict face-down positioning until the following day.         Roman Dyer    D: 01/05/2023 0:12:16       T: 01/05/2023 3:24:03     [email or web address]  Job#: [id number]     Doc#: [id number]    CC:

## 2023-09-12 ENCOUNTER — HOSPITAL ENCOUNTER (OUTPATIENT)
Dept: VASCULAR LAB | Age: 73
Discharge: HOME | End: 2023-09-14
Payer: MEDICARE

## 2023-09-12 DIAGNOSIS — R42 DIZZINESS: ICD-10-CM

## 2023-09-12 LAB
VAS LEFT BULB EDV: 22.1 CM/S
VAS LEFT BULB PSV: 85.2 CM/S
VAS LEFT CCA DIST EDV: 18.9 CM/S
VAS LEFT CCA DIST PSV: 85.2 CM/S
VAS LEFT CCA PROX EDV: 26.9 CM/S
VAS LEFT CCA PROX PSV: 128.9 CM/S
VAS LEFT ECA EDV: 12.38 CM/S
VAS LEFT ECA PSV: 94.9 CM/S
VAS LEFT ICA DIST EDV: 31.9 CM/S
VAS LEFT ICA DIST PSV: 98 CM/S
VAS LEFT ICA PROX EDV: 20.3 CM/S
VAS LEFT ICA PROX PSV: 70.8 CM/S
VAS LEFT ICA/CCA PSV: 1.15 NO UNITS
VAS LEFT VERTEBRAL EDV: 9.3 CM/S
VAS LEFT VERTEBRAL PSV: 39 CM/S
VAS RIGHT BULB EDV: 18.9 CM/S
VAS RIGHT BULB PSV: 77.3 CM/S
VAS RIGHT CCA DIST EDV: 13.7 CM/S
VAS RIGHT CCA DIST PSV: 81.2 CM/S
VAS RIGHT CCA PROX EDV: 17.6 CM/S
VAS RIGHT CCA PROX PSV: 92.8 CM/S
VAS RIGHT ECA EDV: 13.74 CM/S
VAS RIGHT ECA PSV: 105.8 CM/S
VAS RIGHT ICA DIST EDV: 12.4 CM/S
VAS RIGHT ICA DIST PSV: 53.9 CM/S
VAS RIGHT ICA PROX EDV: 20.2 CM/S
VAS RIGHT ICA PROX PSV: 69.5 CM/S
VAS RIGHT ICA/CCA PSV: 1 NO UNITS
VAS RIGHT VERTEBRAL EDV: 15.04 CM/S
VAS RIGHT VERTEBRAL PSV: 46.2 CM/S

## 2023-09-12 PROCEDURE — 93880 EXTRACRANIAL BILAT STUDY: CPT | Performed by: SURGERY

## 2023-09-12 PROCEDURE — 93880 EXTRACRANIAL BILAT STUDY: CPT

## 2023-09-15 ENCOUNTER — HOSPITAL ENCOUNTER (OUTPATIENT)
Facility: CLINIC | Age: 73
Discharge: HOME | End: 2023-09-15
Payer: MEDICARE

## 2023-09-15 DIAGNOSIS — E03.8 OTHER SPECIFIED HYPOTHYROIDISM: ICD-10-CM

## 2023-09-15 PROCEDURE — 36415 COLL VENOUS BLD VENIPUNCTURE: CPT

## 2023-09-15 PROCEDURE — 84439 ASSAY OF FREE THYROXINE: CPT

## 2023-09-15 PROCEDURE — 84443 ASSAY THYROID STIM HORMONE: CPT

## 2023-09-16 LAB
T4 FREE SERPL-MCNC: 1.2 NG/DL (ref 0.9–1.7)
TSH SERPL DL<=0.05 MIU/L-ACNC: 1.53 UIU/ML (ref 0.3–5)

## 2023-10-05 ENCOUNTER — HOSPITAL ENCOUNTER (OUTPATIENT)
Age: 73
Discharge: HOME | End: 2023-10-07
Payer: MEDICARE

## 2023-10-05 ENCOUNTER — HOSPITAL ENCOUNTER (OUTPATIENT)
Dept: MRI IMAGING | Age: 73
Discharge: HOME | End: 2023-10-07
Payer: MEDICARE

## 2023-10-05 DIAGNOSIS — R42 DIZZINESS: ICD-10-CM

## 2023-10-05 DIAGNOSIS — G44.201 ACUTE INTRACTABLE TENSION-TYPE HEADACHE: ICD-10-CM

## 2023-10-05 DIAGNOSIS — R00.2 PALPITATIONS: ICD-10-CM

## 2023-10-05 DIAGNOSIS — R06.00 ACUTE DYSPNEA: ICD-10-CM

## 2023-10-05 LAB
CREAT SERPL-MCNC: 1.3 MG/DL (ref 0.5–0.9)
ECHO AO ROOT DIAM: 2.6 CM
ECHO AV PEAK GRADIENT: 6 MMHG
ECHO AV PEAK VELOCITY: 1.3 M/S
ECHO LA AREA 2C: 15.7 CM2
ECHO LA AREA 4C: 17.4 CM2
ECHO LA DIAMETER: 3.4 CM
ECHO LA MAJOR AXIS: 5.5 CM
ECHO LA MINOR AXIS: 5 CM
ECHO LA TO AORTIC ROOT RATIO: 1.31
ECHO LA VOL 2C: 42 ML (ref 22–52)
ECHO LA VOL 4C: 42 ML (ref 22–52)
ECHO LA VOL BP: 44 ML (ref 22–52)
ECHO LV E' LATERAL VELOCITY: 8 CM/S
ECHO LV E' SEPTAL VELOCITY: 6 CM/S
ECHO LV FRACTIONAL SHORTENING: 36 % (ref 28–44)
ECHO LV INTERNAL DIMENSION DIASTOLIC: 4.4 CM (ref 3.9–5.3)
ECHO LV INTERNAL DIMENSION SYSTOLIC: 2.8 CM
ECHO LV IVSD: 0.8 CM (ref 0.6–0.9)
ECHO LV MASS 2D: 118.6 G (ref 67–162)
ECHO LV POSTERIOR WALL DIASTOLIC: 0.9 CM (ref 0.6–0.9)
ECHO LV RELATIVE WALL THICKNESS RATIO: 0.41
ECHO MV A VELOCITY: 1 M/S
ECHO MV E DECELERATION TIME (DT): 180 MS
ECHO MV E VELOCITY: 0.67 M/S
ECHO MV E/A RATIO: 0.67
ECHO MV E/E' LATERAL: 8.38
ECHO MV E/E' RATIO (AVERAGED): 9.77
ECHO MV E/E' SEPTAL: 11.17
GFR SERPL CREATININE-BSD FRML MDRD: 43 ML/MIN/1.73M2

## 2023-10-05 PROCEDURE — 70553 MRI BRAIN STEM W/O & W/DYE: CPT

## 2023-10-05 PROCEDURE — 82565 ASSAY OF CREATININE: CPT

## 2023-10-05 PROCEDURE — 36415 COLL VENOUS BLD VENIPUNCTURE: CPT

## 2023-10-05 PROCEDURE — 93306 TTE W/DOPPLER COMPLETE: CPT | Performed by: INTERNAL MEDICINE

## 2023-10-05 PROCEDURE — 93306 TTE W/DOPPLER COMPLETE: CPT

## 2023-10-05 PROCEDURE — 6360000004 HC RX CONTRAST MEDICATION: Performed by: FAMILY MEDICINE

## 2023-10-05 PROCEDURE — A9579 GAD-BASE MR CONTRAST NOS,1ML: HCPCS | Performed by: FAMILY MEDICINE

## 2023-10-05 RX ADMIN — GADOTERIDOL 16 ML: 279.3 INJECTION, SOLUTION INTRAVENOUS at 12:10

## 2024-02-06 ENCOUNTER — HOSPITAL ENCOUNTER (OUTPATIENT)
Dept: GENERAL RADIOLOGY | Facility: CLINIC | Age: 74
Discharge: HOME | End: 2024-02-08
Payer: MEDICARE

## 2024-02-06 DIAGNOSIS — R06.00 ACUTE DYSPNEA: ICD-10-CM

## 2024-02-06 DIAGNOSIS — M25.562 ACUTE PAIN OF LEFT KNEE: ICD-10-CM

## 2024-02-06 PROCEDURE — 71046 X-RAY EXAM CHEST 2 VIEWS: CPT

## 2024-02-06 PROCEDURE — 73564 X-RAY EXAM KNEE 4 OR MORE: CPT

## (undated) DEVICE — SYRINGE ST FILTERS W/MCE MEMBRAN

## (undated) DEVICE — 1 EACH 40411 STERILE DISPOSABLE SUPER VIEW® LENS SET & 1 EACH 40100 STERILE MICROSCOPE DRAPE: Brand: SUPER VIEW® PACK

## (undated) DEVICE — 25 GA TROCAR PLUGS: Brand: ALCON

## (undated) DEVICE — DRESSING TRNSPAR W2XL2.75IN FLM SHT SEMIPERMEABLE WIND

## (undated) DEVICE — GLOVE ORANGE PI 8   MSG9080

## (undated) DEVICE — GARMENT,MEDLINE,DVT,INT,CALF,MED, GEN2: Brand: MEDLINE

## (undated) DEVICE — SYRINGE MED 50ML LUERLOCK TIP

## (undated) DEVICE — SUTURE CHROMIC GUT 6-0 ETH1731G

## (undated) DEVICE — RETINA PK

## (undated) DEVICE — OCCUCOAT SYRINGE 1ML 6PK: Brand: OCCUCOAT

## (undated) DEVICE — REVOLUTION DSP 25G ILM FORCEPS: Brand: ALCON GRIESHABER REVOLUTION

## (undated) DEVICE — BLUNTFILL WITH FILTER: Brand: MONOJECT

## (undated) DEVICE — DRESSING TRNSPAR W5XL4.5IN FLM SHT SEMIPERMEABLE WIND

## (undated) DEVICE — SURGICAL PROCEDURE PACK 25 GA VITRECTOMY

## (undated) DEVICE — SYRINGE, LUER LOCK, 10ML: Brand: MEDLINE

## (undated) DEVICE — SYRINGE MED 5ML STD CLR PLAS LUERLOCK TIP N CTRL DISP

## (undated) DEVICE — GLOVE SURG SZ 65 THK91MIL LTX FREE SYN POLYISOPRENE

## (undated) DEVICE — 25 GAUGE ENTRY SYSTEM, ENHANCED, 3 EA: Brand: ALCON

## (undated) DEVICE — GOWN,AURORA,NONREINFORCED,LARGE: Brand: MEDLINE

## (undated) DEVICE — 25+® FINESSE® FLEX LOOP DSP: Brand: ALCON GRIESHABER 25+ FINESSE

## (undated) DEVICE — 25GA EZPASS SOFT TIP CANNULA BOX OF 5: Brand: VORTEX SURGICAL INC

## (undated) DEVICE — STANDARD HYPODERMIC NEEDLE,ALUMINUM HUB: Brand: MONOJECT